# Patient Record
Sex: FEMALE | Race: WHITE | NOT HISPANIC OR LATINO | Employment: PART TIME | URBAN - NONMETROPOLITAN AREA
[De-identification: names, ages, dates, MRNs, and addresses within clinical notes are randomized per-mention and may not be internally consistent; named-entity substitution may affect disease eponyms.]

---
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
## 2020-01-27 ENCOUNTER — OFFICE VISIT (OUTPATIENT)
Dept: OBSTETRICS AND GYNECOLOGY | Facility: CLINIC | Age: 20
End: 2020-01-27

## 2020-01-27 VITALS
HEART RATE: 84 BPM | SYSTOLIC BLOOD PRESSURE: 100 MMHG | WEIGHT: 123.8 LBS | HEIGHT: 60 IN | BODY MASS INDEX: 24.3 KG/M2 | DIASTOLIC BLOOD PRESSURE: 60 MMHG

## 2020-01-27 DIAGNOSIS — B96.89 BV (BACTERIAL VAGINOSIS): Primary | ICD-10-CM

## 2020-01-27 DIAGNOSIS — Z11.3 ROUTINE SCREENING FOR STI (SEXUALLY TRANSMITTED INFECTION): ICD-10-CM

## 2020-01-27 DIAGNOSIS — N76.0 BV (BACTERIAL VAGINOSIS): Primary | ICD-10-CM

## 2020-01-27 PROCEDURE — 87591 N.GONORRHOEAE DNA AMP PROB: CPT | Performed by: NURSE PRACTITIONER

## 2020-01-27 PROCEDURE — 87210 SMEAR WET MOUNT SALINE/INK: CPT | Performed by: NURSE PRACTITIONER

## 2020-01-27 PROCEDURE — 87491 CHLMYD TRACH DNA AMP PROBE: CPT | Performed by: NURSE PRACTITIONER

## 2020-01-27 PROCEDURE — 99214 OFFICE O/P EST MOD 30 MIN: CPT | Performed by: NURSE PRACTITIONER

## 2020-01-27 PROCEDURE — 87661 TRICHOMONAS VAGINALIS AMPLIF: CPT | Performed by: NURSE PRACTITIONER

## 2020-01-27 RX ORDER — METRONIDAZOLE 500 MG/1
500 TABLET ORAL 2 TIMES DAILY
Qty: 14 TABLET | Refills: 0 | Status: SHIPPED | OUTPATIENT
Start: 2020-01-27 | End: 2020-02-03

## 2020-01-27 RX ORDER — ONDANSETRON 4 MG/1
TABLET, ORALLY DISINTEGRATING ORAL
COMMUNITY
Start: 2019-12-23 | End: 2020-07-21

## 2020-01-27 NOTE — PROGRESS NOTES
Subjective   Yasmine Arriaga is a 20 y.o. female.     History of Present Illness   Pt presents with complaints of vaginal irritation, odor and creamy discharge x 2 weeks. She tried 2 diflucan, 72 hours apart but symptoms continued without improvement. No itching or burning, no dysuria, frequency or urgency.     Patient's last menstrual period was 01/08/2020 (exact date). Only 1 sexual partner in the last year but would like STI screening as precaution. Pt is on OCPs for contraceptive.     The following portions of the patient's history were reviewed and updated as appropriate: allergies, current medications, past family history, past medical history, past social history, past surgical history and problem list.    Review of Systems   Constitutional: Negative.  Negative for chills and fever.   Respiratory: Negative.    Cardiovascular: Negative.    Gastrointestinal: Negative.    Genitourinary: Positive for vaginal discharge and vaginal pain (irritation ). Negative for dysuria, frequency, menstrual problem, pelvic pain and urgency.       Objective    Vitals:    01/27/20 0857   BP: 100/60   Pulse: 84         01/27/20  0857   Weight: 56.2 kg (123 lb 12.8 oz)     Body mass index is 24.18 kg/m².    Physical Exam   Constitutional: She is oriented to person, place, and time. She appears well-developed and well-nourished.   Cardiovascular: Normal rate, regular rhythm and normal heart sounds.   Pulmonary/Chest: Effort normal and breath sounds normal.   Genitourinary: Uterus normal and cervix normal. There is no rash, tenderness, lesion or injury on the right labia. There is no rash, tenderness, lesion or injury on the left labia. Cervix does not exhibit motion tenderness. Right adnexum displays no mass, no tenderness and no fullness. Left adnexum displays no mass, no tenderness and no fullness. Vagina exhibits no lesion. There is erythema in the vagina. No tenderness or bleeding in the vagina. No foreign body in the  vagina. No signs of injury around the vagina. Vaginal discharge found.   Genitourinary Comments: Wet prep: positive for clue cells, no yeast buds hyphae or trichomonads. Evaluated by FIOR CHAPA. G/C/T   Neurological: She is alert and oriented to person, place, and time.   Vitals reviewed.        Assessment/Plan   Yasmine was seen today for vaginitis.    Diagnoses and all orders for this visit:    BV (bacterial vaginosis)  -     metroNIDAZOLE (FLAGYL) 500 MG tablet; Take 1 tablet by mouth 2 (Two) Times a Day for 7 days. No alcohol up to 48 hours after last dose    Routine screening for STI (sexually transmitted infection)  -     Chlamydia trachomatis, Neisseria gonorrhoeae, Trichomonas vaginalis, PCR - Swab, Cervix      Discussed findings on exam and wet prep consistent with BV. Treat with Flagyl 500mg BID x 7 days. No alcohol was stressed. Will call with any S/S of secondary candida. RTC if symptoms persist. I will call with STI results and Rx PRN.     RTC for annual well woman exam with first pap smear in January 2021.

## 2020-01-28 LAB
C TRACH RRNA CVX QL NAA+PROBE: NEGATIVE
N GONORRHOEA RRNA SPEC QL NAA+PROBE: NEGATIVE
TRICHOMONAS VAGINALIS PCR: NEGATIVE

## 2020-02-14 DIAGNOSIS — B80 PINWORM INFECTION: Primary | ICD-10-CM

## 2020-02-14 RX ORDER — ALBENDAZOLE 200 MG/1
TABLET, FILM COATED ORAL
Qty: 2 TABLET | Refills: 0 | Status: SHIPPED | OUTPATIENT
Start: 2020-02-14 | End: 2020-07-21

## 2020-07-21 ENCOUNTER — LAB (OUTPATIENT)
Dept: LAB | Facility: OTHER | Age: 20
End: 2020-07-21

## 2020-07-21 PROCEDURE — U0002 COVID-19 LAB TEST NON-CDC: HCPCS | Performed by: PHYSICIAN ASSISTANT

## 2020-07-28 ENCOUNTER — LAB (OUTPATIENT)
Dept: LAB | Facility: OTHER | Age: 20
End: 2020-07-28

## 2020-07-28 PROCEDURE — U0002 COVID-19 LAB TEST NON-CDC: HCPCS | Performed by: PHYSICIAN ASSISTANT

## 2020-07-29 ENCOUNTER — LAB (OUTPATIENT)
Dept: LAB | Facility: OTHER | Age: 20
End: 2020-07-29

## 2020-07-29 PROCEDURE — U0002 COVID-19 LAB TEST NON-CDC: HCPCS | Performed by: PHYSICIAN ASSISTANT

## 2020-09-30 ENCOUNTER — OFFICE VISIT (OUTPATIENT)
Dept: OBSTETRICS AND GYNECOLOGY | Facility: CLINIC | Age: 20
End: 2020-09-30

## 2020-09-30 VITALS
HEART RATE: 84 BPM | HEIGHT: 61 IN | BODY MASS INDEX: 24.17 KG/M2 | WEIGHT: 128 LBS | DIASTOLIC BLOOD PRESSURE: 70 MMHG | SYSTOLIC BLOOD PRESSURE: 108 MMHG

## 2020-09-30 DIAGNOSIS — R10.2 PELVIC PAIN: Primary | ICD-10-CM

## 2020-09-30 DIAGNOSIS — N92.1 BREAKTHROUGH BLEEDING ON BIRTH CONTROL PILLS: ICD-10-CM

## 2020-09-30 LAB
B-HCG UR QL: NEGATIVE
INTERNAL NEGATIVE CONTROL: NEGATIVE
INTERNAL POSITIVE CONTROL: POSITIVE
Lab: NORMAL

## 2020-09-30 PROCEDURE — 99214 OFFICE O/P EST MOD 30 MIN: CPT | Performed by: NURSE PRACTITIONER

## 2020-09-30 PROCEDURE — 81025 URINE PREGNANCY TEST: CPT | Performed by: NURSE PRACTITIONER

## 2020-09-30 RX ORDER — DROSPIRENONE AND ETHINYL ESTRADIOL 0.03MG-3MG
1 KIT ORAL DAILY
Qty: 28 TABLET | Refills: 12 | Status: SHIPPED | OUTPATIENT
Start: 2020-09-30 | End: 2020-11-13 | Stop reason: ALTCHOICE

## 2020-09-30 RX ORDER — VORTIOXETINE 10 MG/1
TABLET, FILM COATED ORAL
COMMUNITY
Start: 2020-09-11 | End: 2022-05-09

## 2020-09-30 NOTE — PROGRESS NOTES
Subjective   Yasmine Arriaga is a 20 y.o. female.     History of Present Illness   Pt presents with concerns of BTB on OCP and pelvic pain. She has hx of BTB prior to placebo pills, describes it as brown spotting. However her LMP was 9/16 and yesterday randomly started passing quarter size clots a few times. She took UPT and it was negative. Confirmed negative today in office. Today bleeding is brown again. She describes right sided pelvic pain for a couple of weeks. Comes and goes, sometimes sharp, other times pressure and cramping. Family hx of ovarian cysts. She denies missing any pills, denies itching, burning, odor, urgency, frequency, dysuria, etc.     The following portions of the patient's history were reviewed and updated as appropriate: allergies, current medications, past family history, past medical history, past social history, past surgical history and problem list.    Review of Systems   Constitutional: Negative.  Negative for chills and fever.   Respiratory: Negative.    Cardiovascular: Negative.    Gastrointestinal: Negative.  Negative for abdominal distention, abdominal pain, constipation, diarrhea, nausea and vomiting.   Genitourinary: Positive for menstrual problem, pelvic pain (right sided), pelvic pressure (right sided) and vaginal bleeding. Negative for difficulty urinating, dyspareunia, dysuria, flank pain, frequency, hematuria, urgency, vaginal discharge and vaginal pain.   Neurological: Negative for dizziness, syncope and light-headedness.   Psychiatric/Behavioral: Depressed mood: managed on trintellix.       Objective    Vitals:    09/30/20 0951   BP: 108/70   Pulse: 84         09/30/20  0951   Weight: 58.1 kg (128 lb)     Body mass index is 24.19 kg/m².    Physical Exam  Vitals signs reviewed. Exam conducted with a chaperone present.   Constitutional:       Appearance: Normal appearance. She is normal weight.   Cardiovascular:      Rate and Rhythm: Normal rate and regular rhythm.       Heart sounds: Normal heart sounds.   Pulmonary:      Effort: Pulmonary effort is normal.      Breath sounds: Normal breath sounds.   Abdominal:      General: Abdomen is flat. Bowel sounds are normal. There is no distension.      Palpations: Abdomen is soft. There is no mass.      Tenderness: There is no abdominal tenderness. There is no guarding or rebound.      Hernia: No hernia is present.   Genitourinary:     General: Normal vulva.      Labia:         Right: No rash, tenderness, lesion or injury.         Left: No tenderness, lesion or injury.       Vagina: No signs of injury and foreign body. Bleeding (dark brown, old bloody discharge) present. No vaginal discharge, erythema, tenderness or lesions.      Cervix: No cervical motion tenderness, discharge, friability, lesion, erythema or eversion.      Uterus: Normal. Not tender.       Adnexa:         Right: Tenderness and fullness present. No mass.          Left: No mass, tenderness or fullness.     Neurological:      Mental Status: She is alert.   Psychiatric:         Mood and Affect: Mood normal.         Behavior: Behavior normal.     PROCEDURE: US NON-OB TRANSVAGINAL     INDICATION:  Pelvic pain, excessive and frequent menstruation      COMPARISON:  None     TECHNIQUE:  Transabdominal, endovaginal     FINDINGS:       - uterus: Normal size, measures measures 7.6 x 3.0 x 5.0 cm                   myomata:  None                   endometrial canal: Normal size, measures 0.28  cm, no focal thickening     - ovaries:        - right:  Normal size, measuring 3.1 x 1.6 x 2.7 cm                    echotexture:   Physiologic , no complex / solid  mass(es)        - left:    Normal size, measuring 3.0 x 1.5 x 1.7 cm                     echotexture:   Physiologic , no complex /  solid mass(es)    - cul-de-sac: No free fluid      IMPRESSION:  Negative examination.           Electronically signed by:  Charlotte Chowdhury MD  9/30/2020 12:27 PM  CDT Workstation:  [id number]      Assessment/Plan   Yasmine was seen today for breakthrough bleeding and pelvic pressure.    Diagnoses and all orders for this visit:    Pelvic pain  -     US Non-ob Transvaginal  -     drospirenone-ethinyl estradiol (Nell 28) 3-0.03 MG per tablet; Take 1 tablet by mouth Daily.    Breakthrough bleeding on birth control pills  -     POC Pregnancy, Urine  -     US Non-ob Transvaginal  -     drospirenone-ethinyl estradiol (Nell 28) 3-0.03 MG per tablet; Take 1 tablet by mouth Daily.      UA is normal. UPT negative. It is possible that she had a cyst that now ruptured since pain is improving. Cyst could potentially cause BTB as well. However, pt has been on this OCP for many years and is agreeable to trying something different. Complete current OCP pack and then switch to Nell. Give it 2-3 packs to see if she responds well and RTC if still having any issues. Ibuprofen PRN for pain. Pt agrees with this plan of care. All questions answered. She is not due for pap testing until 22yo, after 1/12/2021.

## 2020-10-19 ENCOUNTER — OFFICE VISIT (OUTPATIENT)
Dept: OBSTETRICS AND GYNECOLOGY | Facility: CLINIC | Age: 20
End: 2020-10-19

## 2020-10-19 DIAGNOSIS — Z30.09 COUNSELING FOR BIRTH CONTROL REGARDING INTRAUTERINE DEVICE (IUD): Primary | ICD-10-CM

## 2020-10-19 PROCEDURE — 99441 PR PHYS/QHP TELEPHONE EVALUATION 5-10 MIN: CPT | Performed by: NURSE PRACTITIONER

## 2020-10-19 NOTE — PROGRESS NOTES
Subjective   Yasmine Arriaga is a 20 y.o. female.     You have chosen to receive care through a telephone visit. Do you consent to use a telephone visit for your medical care today? Yes    History of Present Illness   Pt presents, via telephone, to discuss paragard IUD. I just saw pt 2-3 weeks ago for BTB and pelvic pain. US was normal. She states pelvic pain has completely resolved. No further issues. We switched OCP at this visit but she isn't due to start it until now. She has been thinking about her contraceptive options and desires a paragard. She has never had an IUD before. She has been on hormonal contraceptives since she was 15yo other than during pregnancy. So she isn't sure what her periods are like without hormones. I counseled pt on all contraceptive options including Mirena IUD. Pt chooses Paragard. She wants a nonhormonal route and voices understanding of the risks of heavier, more painful periods.     The following portions of the patient's history were reviewed and updated as appropriate: allergies, current medications, past family history, past medical history, past social history, past surgical history and problem list.    Review of Systems   Constitutional: Negative.  Negative for chills and fever.   Gastrointestinal: Negative for abdominal pain.   Genitourinary: Negative.  Negative for dyspareunia, dysuria, menstrual problem, pelvic pain, vaginal bleeding and vaginal discharge.       Objective   Physical Exam  Telephone    Assessment/Plan   Diagnoses and all orders for this visit:    1. Counseling for birth control regarding intrauterine device (IUD) (Primary)    I spent 10 minutes counseling patient about all contraceptive options and especially focused on Mirena vs Paragard since pt is most interested in an IUD. I explained the differences in bleeding profiles and the risks that with Paragard she could have heavier, more painful periods and that this is normal. If she isn't willing to  have that, I would recommend against Paragard. Pt states she understands the potential for changes and is willing to accept that risk. She requests Paragard IUD. She will go ahead and  her next OCP pack while we work to approve and receive the device. Once it arrives and pt is informed, she will RTC when she starts her period for insertion. Pt voices understanding and agreement with this plan of care.     Total Time: 10 minutes

## 2020-11-13 ENCOUNTER — OFFICE VISIT (OUTPATIENT)
Dept: OBSTETRICS AND GYNECOLOGY | Facility: CLINIC | Age: 20
End: 2020-11-13

## 2020-11-13 VITALS
WEIGHT: 131.8 LBS | HEART RATE: 85 BPM | SYSTOLIC BLOOD PRESSURE: 110 MMHG | BODY MASS INDEX: 24.88 KG/M2 | HEIGHT: 61 IN | DIASTOLIC BLOOD PRESSURE: 68 MMHG

## 2020-11-13 DIAGNOSIS — Z30.430 ENCOUNTER FOR INSERTION OF PARAGARD IUD: Primary | ICD-10-CM

## 2020-11-13 PROCEDURE — 81025 URINE PREGNANCY TEST: CPT | Performed by: NURSE PRACTITIONER

## 2020-11-13 PROCEDURE — 58300 INSERT INTRAUTERINE DEVICE: CPT | Performed by: NURSE PRACTITIONER

## 2020-11-13 RX ORDER — LIDOCAINE HYDROCHLORIDE 20 MG/ML
SOLUTION OROPHARYNGEAL
COMMUNITY
Start: 2020-11-11 | End: 2021-01-04

## 2020-11-13 RX ORDER — COPPER 313.4 MG/1
INTRAUTERINE DEVICE INTRAUTERINE ONCE
Status: COMPLETED | OUTPATIENT
Start: 2020-11-13 | End: 2020-11-13

## 2020-11-13 RX ORDER — TRIAMCINOLONE ACETONIDE 0.1 %
PASTE (GRAM) DENTAL
COMMUNITY
Start: 2020-11-11 | End: 2021-01-04

## 2020-11-13 RX ADMIN — COPPER: 313.4 INTRAUTERINE DEVICE INTRAUTERINE at 14:19

## 2020-11-13 NOTE — PROGRESS NOTES
IUD Insertion    Patient's last menstrual period was 11/11/2020 (exact date). UPT negative    Date of procedure:  11/13/2020    Risks and benefits discussed? yes  All questions answered? yes  Consents given by The patient  Written consent obtained? yes    Local anesthesia used:  no    Procedure documentation:    After verifying the patient had a low probability of being pregnant and met the criteria for insertion, a sterile speculum has placed and the cervix was cleansed with an antiseptic solution.  Vaginal discharge was scant.  The anterior lip of the cervix was grasped with a tenaculum and the uterine cavity was gently sounded. There was no difficulty passing the sound through the cervix.  Cervical dilation did not need to be performed prior to placing the IUD.  The uterus was anteverted and sounded to 8 cms.  The ParaGard was then prepared per the manufacturers instructions.    The Paraguard was advanced through the cervical canal until the upper edge of the flange was flush with the external cervix.  The insertion js was held in place while the insertion tube was withdrawn.  I waited 10-15 seconds for the arms of the IUS to fully open and the devise to seat in the cavity.  The js was removed first followed by the insertion tube.. The string was cut 3 cms in length.  Bleeding from the cervix was scant.    She tolerated the procedure without any difficulty.    Post procedure instructions: Call if any fever or excessive bleeding or pain.    Follow up needed:  6 weeks string check    Diagnosis Encounter for insertion of ParaGard IUD    This note was electronically signed.    Aleshia Jimenez, APRN  11/13/2020

## 2021-01-04 ENCOUNTER — OFFICE VISIT (OUTPATIENT)
Dept: OBSTETRICS AND GYNECOLOGY | Facility: CLINIC | Age: 21
End: 2021-01-04

## 2021-01-04 VITALS
BODY MASS INDEX: 25 KG/M2 | HEIGHT: 61 IN | DIASTOLIC BLOOD PRESSURE: 60 MMHG | WEIGHT: 132.4 LBS | HEART RATE: 92 BPM | SYSTOLIC BLOOD PRESSURE: 100 MMHG

## 2021-01-04 DIAGNOSIS — N94.6 DYSMENORRHEA: ICD-10-CM

## 2021-01-04 DIAGNOSIS — Z30.431 IUD CHECK UP: Primary | ICD-10-CM

## 2021-01-04 PROCEDURE — 99213 OFFICE O/P EST LOW 20 MIN: CPT | Performed by: NURSE PRACTITIONER

## 2021-01-04 RX ORDER — COPPER 313.4 MG/1
1 INTRAUTERINE DEVICE INTRAUTERINE ONCE
COMMUNITY
End: 2022-05-09

## 2021-01-04 NOTE — PROGRESS NOTES
"Chief Complaint  paragard string check    Subjective          Yasmine Arriaga presents to [facility] for   History of Present Illness  Pt presents for Paragard string check after insertion on 11/13/20. Doing ok on it. Had a 12 day period with severe cramping but resolved on its own. Started her period again today and is spotting. Tylenol and Midol mildly helped with pain. She didn't try ibuprofen alone. Used heating pad with some relief. Has not been able to feel her strings. Has not had pain with intercourse.     Objective   Vital Signs:   /60   Pulse 92   Ht 154.9 cm (61\")   Wt 60.1 kg (132 lb 6.4 oz)   BMI 25.02 kg/m²     Physical Exam  Exam conducted with a chaperone present.   Abdominal:      Tenderness: There is no abdominal tenderness.   Genitourinary:     General: Normal vulva.      Labia:         Right: No rash, tenderness or lesion.         Left: No rash, tenderness or lesion.       Vagina: Bleeding (menstruating) present. No vaginal discharge, erythema or tenderness.      Cervix: Cervical bleeding present. No cervical motion tenderness, friability or erythema.      Uterus: Normal.       Adnexa: Right adnexa normal and left adnexa normal.         Neurological:      Mental Status: She is oriented to person, place, and time.        Result Review :        Assessment and Plan    Problem List Items Addressed This Visit     None      Visit Diagnoses     IUD check up    -  Primary    Dysmenorrhea              Follow Up   Return in about 6 months (around 7/4/2021) for Annual physical, turns 21 later this month and can come anytime in the next 6 months. .     Pt's strings at appropriate length. Discussed the prolonged period and dysmenorrhea. -Ibuprofen 600mg q 6 hrs or 800mg q 8hrs prn for cramping; start early to get ahead of pain. Encourage exercise, multivitamin, and increased water intake. Home remedies include hot showers, heating pads, etc. If periods are excessive " or pain is severe, RTC for further evaluation. She voices understanding that heavier, more painful periods is a potential with Paragard.     Pt turns 21 in a few weeks. Encouraged her to RTC soon for a complete well woman exam with pap testing. Pt agrees with this plan of care.

## 2022-05-09 ENCOUNTER — OFFICE VISIT (OUTPATIENT)
Dept: OBSTETRICS AND GYNECOLOGY | Facility: CLINIC | Age: 22
End: 2022-05-09

## 2022-05-09 ENCOUNTER — LAB (OUTPATIENT)
Dept: LAB | Facility: OTHER | Age: 22
End: 2022-05-09

## 2022-05-09 VITALS
DIASTOLIC BLOOD PRESSURE: 70 MMHG | WEIGHT: 139.2 LBS | BODY MASS INDEX: 26.28 KG/M2 | HEART RATE: 86 BPM | OXYGEN SATURATION: 98 % | HEIGHT: 61 IN | SYSTOLIC BLOOD PRESSURE: 98 MMHG

## 2022-05-09 DIAGNOSIS — Z30.011 ORAL CONTRACEPTION INITIATION: ICD-10-CM

## 2022-05-09 DIAGNOSIS — Z11.3 ROUTINE SCREENING FOR STI (SEXUALLY TRANSMITTED INFECTION): ICD-10-CM

## 2022-05-09 DIAGNOSIS — Z01.419 ENCOUNTER FOR GYNECOLOGICAL EXAMINATION WITH PAPANICOLAOU SMEAR OF CERVIX: Primary | ICD-10-CM

## 2022-05-09 PROBLEM — R00.0 SINUS TACHYCARDIA: Status: ACTIVE | Noted: 2022-02-15

## 2022-05-09 PROCEDURE — 99395 PREV VISIT EST AGE 18-39: CPT | Performed by: NURSE PRACTITIONER

## 2022-05-09 PROCEDURE — 3008F BODY MASS INDEX DOCD: CPT | Performed by: NURSE PRACTITIONER

## 2022-05-09 PROCEDURE — 2014F MENTAL STATUS ASSESS: CPT | Performed by: NURSE PRACTITIONER

## 2022-05-09 RX ORDER — DROSPIRENONE AND ETHINYL ESTRADIOL 0.02-3(28)
1 KIT ORAL DAILY
Qty: 28 TABLET | Refills: 3 | Status: SHIPPED | OUTPATIENT
Start: 2022-05-09 | End: 2022-08-08 | Stop reason: SDUPTHER

## 2022-05-09 RX ORDER — ACETAMINOPHEN AND CODEINE PHOSPHATE 120; 12 MG/5ML; MG/5ML
SOLUTION ORAL
COMMUNITY
Start: 2022-04-22 | End: 2022-05-09

## 2022-05-09 NOTE — PROGRESS NOTES
Subjective   Chief Complaint   Patient presents with   • Gynecologic Exam     NASREEN Arriaga is a 22 y.o. year old  presenting to be seen for her annual exam.  Today she wants to change OCPs. She was put on micronor in February after having concerns on Nortrel 1/35s after Paraguard was removed last March at Dr. Oliver's office.     Patient's last menstrual period was 2022 (approximate).    OB History        1    Para   1    Term   1            AB        Living   1       SAB        IAB        Ectopic        Molar        Multiple   0    Live Births   1                Current birth control method: oral progesterone-only contraceptive.    She is sexually active.  In the past 12 months there has not been new sexual partners.  Condoms ARE typically used.      Past 6 month menstrual history:    Cycle Frequency: unpredictable  irregular   Menstrual cycle character: flow is typically normal   Cycle Duration: 5 - 10+   Number of heavy days of flows: 2   Dysmenorrhea: moderate and is not affecting her activities of daily living   PMS: is not affecting her activities of daily living   Intermenstrual bleeding present: yes   Post-coital bleeding present: no     She exercises regularly: yes.  She wears her seat belt:yes.  She has concerns about domestic violence: no.  Last colonoscopy: Never  Last DEXA: Never  Last MMG: Never  Last pap: 3/4/21  History of abnormal PAP: No    The following portions of the patient's history were reviewed and updated as appropriate:problem list, current medications, allergies, past family history, past medical history, past social history and past surgical history.    Social History    Tobacco Use      Smoking status: Former Smoker        Packs/day: 0.75        Years: 5.00        Pack years: 3.75        Quit date: 2019        Years since quitting: 3.3      Smokeless tobacco: Never Used    Social History     Substance and Sexual Activity   Alcohol Use No  "     Review of Systems   Constitutional: Negative.  Negative for chills and fever.   Respiratory: Negative.    Cardiovascular: Negative.    Gastrointestinal: Negative.  Negative for constipation and diarrhea. Abdominal distention: gluten intolerance but not remaining gluten free.   Endocrine: Negative.    Genitourinary: Positive for menstrual problem (irregular on POP). Negative for difficulty urinating, dysuria, pelvic pain, vaginal discharge and vaginal pain.   Skin:        Acne    Neurological: Negative for dizziness, syncope, light-headedness and headaches.   Psychiatric/Behavioral: Negative for suicidal ideas. The patient is not nervous/anxious.         Hx of anxiety and depression         Objective   BP 98/70   Pulse 86   Ht 154.9 cm (61\")   Wt 63.1 kg (139 lb 3.2 oz)   LMP 04/14/2022 (Approximate)   SpO2 98%   Breastfeeding No   BMI 26.30 kg/m²     General:  well developed; well nourished  no acute distress   Skin:  No suspicious lesions seen   Thyroid: not examined   Breasts:  Examined in supine position  Symmetric without masses or skin dimpling  Nipples normal without inversion, lesions or discharge  There are no palpable axillary nodes  Fibrocystic changes are present both breasts without a discrete mass   Abdomen: soft, non-tender; no masses  no umbilical or inguinal hernias are present  no hepato-splenomegaly  Normal findings: bowel sounds normal   Cardiac: Heart sounds are normal.  Regular rate and rhythm without murmur, gallop or rub.   Resp: Normal expansion.  Clear to auscultation.  No rales, rhonchi, or wheezing.   Psych: alert,oriented, in NAD with a full range of affect, normal behavior and no psychotic features   Pelvis: Uterus:  Clinical staff was present for exam  External genitalia:  normal appearance of the external genitalia including Bartholin's and [place]'s glands.  :  urethral meatus normal;  Vaginal:  normal pink mucosa without prolapse or lesions and blood present -  small " amount and brown spotting  Cervix:  normal appearance.  Uterus:  normal size, shape and consistency  Adnexa:  normal bimanual exam of the adnexa.  Rectal:  digital rectal exam not performed; anus visually normal appearing.     Lab Review   No data reviewed    Imaging  No data reviewed       Diagnoses and all orders for this visit:    1. Encounter for gynecological examination with Papanicolaou smear of cervix (Primary)  -     LIQUID-BASED PAP SMEAR, [facility] ([facility])    2. Routine screening for STI (sexually transmitted infection)  -     CHLAMYDIA/GONORRHOEAE, [facility] - ThinPrep Vial, Cervix    3. Oral contraception initiation  -     drospirenone-ethinyl estradiol (LIZETTE) 3-0.02 MG per tablet; Take 1 tablet by mouth Daily.  Dispense: 28 tablet; Refill: 3    Pap results: I will send card in mail or call if abnormal. RTC annually for WWE or sooner PRN.     SBE encouraged monthly. MMG not indicated until 39yo.     G/C testing on thin prep per guidelines.     Praised for smoking cessation.     Discussed diet and recommendations for gluten free diet to help GI concerns of bloating.     Mental health is currently well managed.     Discussed differences in FERNANDA vs POP. Pt does not like the irregular bleeding and acne on Micronor and wishes to try traditional OCP again. Given acne concerns, I would choose drospironone containing. Recent potassium lab draws have been great. She monitors them often. We can change progestin PRN. Finish current week of POP and then switch to new OCP. Pt voices understanding. FU in 3 months on new OCP or sooner PRN.     This note was electronically signed.    Aleshia Jimenez, APRN  May 9, 2022

## 2022-05-12 LAB — REF LAB TEST METHOD: NORMAL

## 2022-05-16 LAB — REF LAB TEST METHOD: NORMAL

## 2022-08-08 ENCOUNTER — OFFICE VISIT (OUTPATIENT)
Dept: OBSTETRICS AND GYNECOLOGY | Facility: CLINIC | Age: 22
End: 2022-08-08

## 2022-08-08 VITALS
BODY MASS INDEX: 25.9 KG/M2 | SYSTOLIC BLOOD PRESSURE: 110 MMHG | HEART RATE: 88 BPM | WEIGHT: 137.2 LBS | HEIGHT: 61 IN | DIASTOLIC BLOOD PRESSURE: 68 MMHG

## 2022-08-08 DIAGNOSIS — L70.0 ACNE VULGARIS: Primary | ICD-10-CM

## 2022-08-08 DIAGNOSIS — Z30.41 ORAL CONTRACEPTIVE PILL SURVEILLANCE: ICD-10-CM

## 2022-08-08 PROCEDURE — 99213 OFFICE O/P EST LOW 20 MIN: CPT | Performed by: NURSE PRACTITIONER

## 2022-08-08 RX ORDER — DROSPIRENONE AND ETHINYL ESTRADIOL 0.02-3(28)
1 KIT ORAL DAILY
Qty: 28 TABLET | Refills: 12 | Status: SHIPPED | OUTPATIENT
Start: 2022-08-08

## 2022-08-08 RX ORDER — DESVENLAFAXINE 25 MG/1
TABLET, EXTENDED RELEASE ORAL
COMMUNITY
Start: 2022-07-27

## 2022-08-08 RX ORDER — LEVOCETIRIZINE DIHYDROCHLORIDE 5 MG/1
TABLET, FILM COATED ORAL
COMMUNITY
Start: 2022-07-25

## 2022-08-09 NOTE — PROGRESS NOTES
"Subjective   Yasmine Arriaga is a 22 y.o. female.     History of Present Illness   Pt presents for 3 month FU on OCP change to Lizette for acne and irregular bleeding. Pt is doing much better on this. Patient's last menstrual period was 08/06/2022 (exact date). Bleeding is regular, 5-6 days, moderate flow, now cramping. She mentions passing a rather large clot in the last day but barely having much \"free flowing\" bleeding. Denies any pelvic pain. Acne has greatly improved. Wishes to continue use of this OCP.     The following portions of the patient's history were reviewed and updated as appropriate: allergies, current medications, past family history, past medical history, past social history, past surgical history and problem list.    Review of Systems   Constitutional: Negative.  Negative for chills and fever.   Respiratory: Negative.    Cardiovascular: Negative.    Genitourinary: Negative for menstrual problem and pelvic pain.   Skin:        Acne improved   Neurological: Negative for dizziness, syncope, light-headedness and headache.   Psychiatric/Behavioral: Negative.        Objective   Physical Exam  Vitals reviewed.   Constitutional:       Appearance: Normal appearance.   Cardiovascular:      Rate and Rhythm: Normal rate and regular rhythm.      Heart sounds: Normal heart sounds.   Pulmonary:      Effort: Pulmonary effort is normal.      Breath sounds: Normal breath sounds.   Skin:     Comments: No acne noted    Neurological:      Mental Status: She is alert and oriented to person, place, and time.   Psychiatric:         Mood and Affect: Mood normal.         Behavior: Behavior normal.           Assessment & Plan   Diagnoses and all orders for this visit:    1. Acne vulgaris (Primary)  -     drospirenone-ethinyl estradiol (LIZETTE) 3-0.02 MG per tablet; Take 1 tablet by mouth Daily.  Dispense: 28 tablet; Refill: 12    2. Oral contraceptive pill surveillance  -     drospirenone-ethinyl estradiol (LIZETTE) 3-0.02 MG " per tablet; Take 1 tablet by mouth Daily.  Dispense: 28 tablet; Refill: 12    Pt is doing great on this pill. Continue this OCP daily as prescribed. Discussed concern with clot. It was moderate in size and has since resolved. Pt will monitor and RTC should she be saturating through a tampon or pad every hour. Otherwise, RTC in 1 year for WWE.

## 2022-08-30 DIAGNOSIS — L70.0 ACNE VULGARIS: ICD-10-CM

## 2022-08-30 DIAGNOSIS — Z30.41 ORAL CONTRACEPTIVE PILL SURVEILLANCE: ICD-10-CM

## 2022-08-30 RX ORDER — DROSPIRENONE AND ETHINYL ESTRADIOL 0.02-3(28)
1 KIT ORAL DAILY
Qty: 28 TABLET | Refills: 12 | OUTPATIENT
Start: 2022-08-30

## 2024-11-09 NOTE — PROGRESS NOTES
17-year-old G0 at 11 weeks 1 day gestation.  Positive fetal heart tones today.  She had some bleeding in the beginning of this pregnancy at about 7 weeks gestation.  Ultrasound showed a living intrauterine pregnancy at 7 weeks and 1 day.    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  The father of the baby will be tested for carrier status.    She has some morning sickness and symptoms of a urinary tract infection.    We will check her new OB labs today.  Place her on folic acid, Phenergan, Zofran, Macrobid, and Pyridium.    Follow-up in 4 weeks.  Cervical cultures at that time.  Follow-up sooner as needed.  All her questions and mother's questions were answered.   show